# Patient Record
Sex: FEMALE | Race: WHITE | NOT HISPANIC OR LATINO | Employment: FULL TIME | URBAN - METROPOLITAN AREA
[De-identification: names, ages, dates, MRNs, and addresses within clinical notes are randomized per-mention and may not be internally consistent; named-entity substitution may affect disease eponyms.]

---
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
## 2023-01-30 ENCOUNTER — APPOINTMENT (OUTPATIENT)
Dept: GENERAL RADIOLOGY | Facility: CLINIC | Age: 23
End: 2023-01-30
Attending: EMERGENCY MEDICINE
Payer: COMMERCIAL

## 2023-01-30 ENCOUNTER — HOSPITAL ENCOUNTER (EMERGENCY)
Facility: CLINIC | Age: 23
Discharge: HOME | End: 2023-01-30
Attending: EMERGENCY MEDICINE | Admitting: EMERGENCY MEDICINE
Payer: COMMERCIAL

## 2023-01-30 VITALS
TEMPERATURE: 98.4 F | WEIGHT: 200 LBS | SYSTOLIC BLOOD PRESSURE: 119 MMHG | HEART RATE: 82 BPM | OXYGEN SATURATION: 100 % | HEIGHT: 66 IN | RESPIRATION RATE: 16 BRPM | DIASTOLIC BLOOD PRESSURE: 85 MMHG | BODY MASS INDEX: 32.14 KG/M2

## 2023-01-30 DIAGNOSIS — K21.9 GASTROESOPHAGEAL REFLUX DISEASE WITHOUT ESOPHAGITIS: ICD-10-CM

## 2023-01-30 DIAGNOSIS — R07.9 CHEST PAIN, UNSPECIFIED TYPE: ICD-10-CM

## 2023-01-30 LAB
ALBUMIN SERPL BCG-MCNC: 4.1 G/DL (ref 3.5–5.2)
ALP SERPL-CCNC: 120 U/L (ref 35–104)
ALT SERPL W P-5'-P-CCNC: 12 U/L (ref 10–35)
ANION GAP SERPL CALCULATED.3IONS-SCNC: 13 MMOL/L (ref 7–15)
AST SERPL W P-5'-P-CCNC: 15 U/L (ref 10–35)
ATRIAL RATE - MUSE: 66 BPM
BASOPHILS # BLD AUTO: 0 10E3/UL (ref 0–0.2)
BASOPHILS NFR BLD AUTO: 0 %
BILIRUB SERPL-MCNC: 0.8 MG/DL
BUN SERPL-MCNC: 9.6 MG/DL (ref 6–20)
CALCIUM SERPL-MCNC: 9.7 MG/DL (ref 8.6–10)
CHLORIDE SERPL-SCNC: 102 MMOL/L (ref 98–107)
CREAT SERPL-MCNC: 0.69 MG/DL (ref 0.51–0.95)
D DIMER PPP FEU-MCNC: <0.27 UG/ML FEU (ref 0–0.5)
DEPRECATED HCO3 PLAS-SCNC: 24 MMOL/L (ref 22–29)
DIASTOLIC BLOOD PRESSURE - MUSE: NORMAL MMHG
EOSINOPHIL # BLD AUTO: 0 10E3/UL (ref 0–0.7)
EOSINOPHIL NFR BLD AUTO: 0 %
ERYTHROCYTE [DISTWIDTH] IN BLOOD BY AUTOMATED COUNT: 13.4 % (ref 10–15)
GFR SERPL CREATININE-BSD FRML MDRD: >90 ML/MIN/1.73M2
GLUCOSE SERPL-MCNC: 97 MG/DL (ref 70–99)
HCT VFR BLD AUTO: 39.3 % (ref 35–47)
HGB BLD-MCNC: 12.6 G/DL (ref 11.7–15.7)
IMM GRANULOCYTES # BLD: 0 10E3/UL
IMM GRANULOCYTES NFR BLD: 0 %
INTERPRETATION ECG - MUSE: NORMAL
LIPASE SERPL-CCNC: 12 U/L (ref 13–60)
LYMPHOCYTES # BLD AUTO: 2 10E3/UL (ref 0.8–5.3)
LYMPHOCYTES NFR BLD AUTO: 29 %
MCH RBC QN AUTO: 28.2 PG (ref 26.5–33)
MCHC RBC AUTO-ENTMCNC: 32.1 G/DL (ref 31.5–36.5)
MCV RBC AUTO: 88 FL (ref 78–100)
MONOCYTES # BLD AUTO: 0.4 10E3/UL (ref 0–1.3)
MONOCYTES NFR BLD AUTO: 6 %
NEUTROPHILS # BLD AUTO: 4.5 10E3/UL (ref 1.6–8.3)
NEUTROPHILS NFR BLD AUTO: 65 %
NRBC # BLD AUTO: 0 10E3/UL
NRBC BLD AUTO-RTO: 0 /100
P AXIS - MUSE: 33 DEGREES
PLATELET # BLD AUTO: 240 10E3/UL (ref 150–450)
POTASSIUM SERPL-SCNC: 3.9 MMOL/L (ref 3.4–5.3)
PR INTERVAL - MUSE: 164 MS
PROT SERPL-MCNC: 7.7 G/DL (ref 6.4–8.3)
QRS DURATION - MUSE: 94 MS
QT - MUSE: 380 MS
QTC - MUSE: 398 MS
R AXIS - MUSE: 72 DEGREES
RBC # BLD AUTO: 4.47 10E6/UL (ref 3.8–5.2)
SODIUM SERPL-SCNC: 139 MMOL/L (ref 136–145)
SYSTOLIC BLOOD PRESSURE - MUSE: NORMAL MMHG
T AXIS - MUSE: 39 DEGREES
TROPONIN T SERPL HS-MCNC: <6 NG/L
VENTRICULAR RATE- MUSE: 66 BPM
WBC # BLD AUTO: 7 10E3/UL (ref 4–11)

## 2023-01-30 PROCEDURE — 96375 TX/PRO/DX INJ NEW DRUG ADDON: CPT | Performed by: EMERGENCY MEDICINE

## 2023-01-30 PROCEDURE — 250N000009 HC RX 250: Performed by: EMERGENCY MEDICINE

## 2023-01-30 PROCEDURE — 84484 ASSAY OF TROPONIN QUANT: CPT | Performed by: EMERGENCY MEDICINE

## 2023-01-30 PROCEDURE — 250N000011 HC RX IP 250 OP 636: Performed by: EMERGENCY MEDICINE

## 2023-01-30 PROCEDURE — 96374 THER/PROPH/DIAG INJ IV PUSH: CPT | Performed by: EMERGENCY MEDICINE

## 2023-01-30 PROCEDURE — 83690 ASSAY OF LIPASE: CPT | Performed by: EMERGENCY MEDICINE

## 2023-01-30 PROCEDURE — 71046 X-RAY EXAM CHEST 2 VIEWS: CPT

## 2023-01-30 PROCEDURE — 99285 EMERGENCY DEPT VISIT HI MDM: CPT | Mod: 25 | Performed by: EMERGENCY MEDICINE

## 2023-01-30 PROCEDURE — 80053 COMPREHEN METABOLIC PANEL: CPT | Performed by: EMERGENCY MEDICINE

## 2023-01-30 PROCEDURE — 36415 COLL VENOUS BLD VENIPUNCTURE: CPT | Performed by: EMERGENCY MEDICINE

## 2023-01-30 PROCEDURE — 250N000013 HC RX MED GY IP 250 OP 250 PS 637: Performed by: EMERGENCY MEDICINE

## 2023-01-30 PROCEDURE — 85025 COMPLETE CBC W/AUTO DIFF WBC: CPT | Performed by: EMERGENCY MEDICINE

## 2023-01-30 PROCEDURE — 93005 ELECTROCARDIOGRAM TRACING: CPT | Performed by: EMERGENCY MEDICINE

## 2023-01-30 PROCEDURE — 85379 FIBRIN DEGRADATION QUANT: CPT | Performed by: EMERGENCY MEDICINE

## 2023-01-30 RX ORDER — KETOROLAC TROMETHAMINE 15 MG/ML
15 INJECTION, SOLUTION INTRAMUSCULAR; INTRAVENOUS ONCE
Status: COMPLETED | OUTPATIENT
Start: 2023-01-30 | End: 2023-01-30

## 2023-01-30 RX ADMIN — FAMOTIDINE 20 MG: 10 INJECTION INTRAVENOUS at 09:35

## 2023-01-30 RX ADMIN — ALUMINUM HYDROXIDE, MAGNESIUM HYDROXIDE, AND SIMETHICONE 30 ML: 200; 200; 20 SUSPENSION ORAL at 10:02

## 2023-01-30 RX ADMIN — KETOROLAC TROMETHAMINE 15 MG: 15 INJECTION, SOLUTION INTRAMUSCULAR; INTRAVENOUS at 09:35

## 2023-01-30 ASSESSMENT — ENCOUNTER SYMPTOMS
SHORTNESS OF BREATH: 1
COUGH: 0
FEVER: 0

## 2023-01-30 ASSESSMENT — ACTIVITIES OF DAILY LIVING (ADL): ADLS_ACUITY_SCORE: 35

## 2023-01-30 NOTE — ED TRIAGE NOTES
SOB and left sided chest pain since Saturday.     Triage Assessment     Row Name 01/30/23 0896       Triage Assessment (Adult)    Airway WDL WDL       Respiratory WDL    Respiratory WDL X  reports SOB, denies cough       Skin Circulation/Temperature WDL    Skin Circulation/Temperature WDL WDL       Cardiac WDL    Cardiac WDL X  left sided chest pain since saturday with SOB       Peripheral/Neurovascular WDL    Peripheral Neurovascular WDL WDL       Cognitive/Neuro/Behavioral WDL    Cognitive/Neuro/Behavioral WDL WDL

## 2023-01-30 NOTE — ED PROVIDER NOTES
"  History     Chief Complaint:  Chest Pain and Shortness of Breath       The history is provided by the patient.      Sharonda Moore is a 22 year old female who presents with sharp left-sided chest pain and shortness of breath since Saturday at noon. The pain is worsened when taking deep breaths, and since 0600 this morning it has been constant. She recently returned from a trip to [place] by plane, and has taken birth control in [place]. She denies fever, cough, congestion, recent injury, or history of blood clots.  Patient with a history of GERD and not currently medicated for this.  She also states she has been using ibuprofen about once a week.  No family history of sudden cardiac death or early MI      Independent Historian: None     Review of External Notes: ED visit from 10/23/2019    ROS:  Review of Systems   Constitutional: Negative for fever.   HENT: Negative for congestion.    Respiratory: Positive for shortness of breath. Negative for cough.    Cardiovascular: Positive for chest pain.   All other systems reviewed and are negative.      Allergies:  The patient has no known allergies.     Medications:  The patient is currently on no regular medications.    Past Medical History:     The patient denies past medical history.      Past Surgical History:    None    Family History:    None    Social History:  The patient presented to the ED accompanied.  The patient arrived to the ED via private vehicle.  The patient denies cigarette use.    Physical Exam     Patient Vitals for the past 24 hrs:   BP Temp Temp src Pulse Resp SpO2 Height Weight   01/30/23 1040 119/85 -- -- 82 16 100 % -- --   01/30/23 0829 132/68 98.4  F (36.9  C) Oral 83 17 98 % 1.676 m (5' 6\") 90.7 kg (200 lb)        Physical Exam  General: Patient is alert and normal appearing.  HEENT: Head atraumatic    Eyes: pupils equal and reactive. Conjunctiva clear   Nares: patent   Oropharynx: no lesions, uvula midline, no palatal draping, normal " voice, no trismus  Neck: Supple without lymphadenopathy, no meningismus  Chest: Heart regular rate and rhythm.   Lungs: Equal clear to auscultation with no wheeze or rales  Abdomen: Soft, normal epigastric tenderness to palpation with no rebound or guarding, nondistended, normal bowel sounds  Back: No costovertebral angle tenderness, no midline C, T or L spine tenderness  Neuro: Grossly nonfocal, normal speech, strength equal bilaterally, CN 2-12 intact  Extremities: No deformities, equal radial and DP pulses. No clubbing, cyanosis.  No edema  Skin: Warm and dry with no rash.       Emergency Department Course     ECG results from 01/30/23 - EKG read by Dr. Gardner at 0816.   EKG 12-lead, tracing only     Value    Systolic Blood Pressure     Diastolic Blood Pressure     Ventricular Rate 66    Atrial Rate 66    HI Interval 164    QRS Duration 94        QTc 398    P Axis 33    R AXIS 72    T Axis 39    Interpretation ECG      Sinus rhythm with marked sinus arrhythmia  Otherwise normal ECG  When compared with ECG of 23-OCT-2019 13:39,  Vent. rate has decreased BY  54 BPM       Imaging:  XR Chest 2 Views   Final Result   IMPRESSION: Negative chest.      SHEY MORA MD            SYSTEM ID:  [id number]         Report per radiology    Laboratory:  Labs Ordered and Resulted from Time of ED Arrival to Time of ED Departure   COMPREHENSIVE METABOLIC PANEL - Abnormal       Result Value    Sodium 139      Potassium 3.9      Chloride 102      Carbon Dioxide (CO2) 24      Anion Gap 13      Urea Nitrogen 9.6      Creatinine 0.69      Calcium 9.7      Glucose 97      Alkaline Phosphatase 120 (*)     AST 15      ALT 12      Protein Total 7.7      Albumin 4.1      Bilirubin Total 0.8      GFR Estimate >90     LIPASE - Abnormal    Lipase 12 (*)    D DIMER QUANTITATIVE - Normal    D-Dimer Quantitative <0.27     TROPONIN T, HIGH SENSITIVITY - Normal    Troponin T, High Sensitivity <6     CBC WITH PLATELETS AND DIFFERENTIAL    WBC  Count 7.0      RBC Count 4.47      Hemoglobin 12.6      Hematocrit 39.3      MCV 88      MCH 28.2      MCHC 32.1      RDW 13.4      Platelet Count 240      % Neutrophils 65      % Lymphocytes 29      % Monocytes 6      % Eosinophils 0      % Basophils 0      % Immature Granulocytes 0      NRBCs per 100 WBC 0      Absolute Neutrophils 4.5      Absolute Lymphocytes 2.0      Absolute Monocytes 0.4      Absolute Eosinophils 0.0      Absolute Basophils 0.0      Absolute Immature Granulocytes 0.0      Absolute NRBCs 0.0        Emergency Department Course & Assessments:    Interventions:  Medications   ketorolac (TORADOL) injection 15 mg (15 mg Intravenous Given 1/30/23 0935)   famotidine (PEPCID) injection 20 mg (20 mg Intravenous Given 1/30/23 0935)   lidocaine (viscous) (XYLOCAINE) 2 % 15 mL, alum & mag hydroxide-simethicone (MAALOX) 15 mL GI Cocktail (30 mLs Oral Given 1/30/23 1002)            Independent Interpretation (X-rays, CTs, rhythm strip):  I reviewed the X-ray.     Consultations/Discussion of Management or Tests:  None     Social Determinants of Health affecting care:  None     Assessments:  0906 I obtained history and examined the patient, as noted above.  1037 I rechecked and updated the patient. She is feeling improved after a GI cocktail.    Disposition:  The patient was discharged to home.     Impression & Plan      Medical Decision Making:  Sharonda Moore is a 22 year old female who presents for evaluation of chest pain.  This pain has lasted for several hours now. Initial laboratory and imaging tests have come back normal.  There has been no progression of symptoms or other new concerning symptoms.   Relief of symptoms following GI cocktail.  D-dimer is negative making PE unlikely.  Chest x-ray without pneumothorax or infiltrate.  Troponin is negative and EKG without new acute ischemic changes.  Do not suspect aortic dissection based on description and chest x-ray.    A broad differential was of course  considered.  Exceedingly low risk for unstable angina at this point and will therefore not admit formally and administer heparin.    Discussed HEART score with patient and shared decision making regarding disposition and further workup was done. .      Suspect a component of GERD.  We will place her on omeprazole twice daily.  Recommend follow-up with primary care as well as GI.  If she has progression of symptoms with a further declare himself she is requested to return to the emergency department for further work-up and evaluation.      Diagnosis:    ICD-10-CM    1. Chest pain, unspecified type  R07.9       2. Gastroesophageal reflux disease without esophagitis  K21.9            Discharge Medications:  Discharge Medication List as of 1/30/2023 10:43 AM      START taking these medications    Details   omeprazole (PRILOSEC) 20 MG DR capsule Take 1 capsule (20 mg) by mouth 2 times daily for 30 days, Disp-60 capsule, R-0, Local Print              Scribe Disclosure:  Martin MC, am serving as a scribe at 8:48 AM on 1/30/2023 to document services personally performed by Trang Gardner MD based on my observations and the provider's statements to me.      Trang Gardner MD  01/30/23 2793

## 2023-12-08 ENCOUNTER — HOSPITAL ENCOUNTER (EMERGENCY)
Facility: CLINIC | Age: 23
Discharge: HOME | End: 2023-12-08
Attending: EMERGENCY MEDICINE | Admitting: EMERGENCY MEDICINE
Payer: COMMERCIAL

## 2023-12-08 VITALS
HEART RATE: 106 BPM | SYSTOLIC BLOOD PRESSURE: 118 MMHG | RESPIRATION RATE: 18 BRPM | BODY MASS INDEX: 32.28 KG/M2 | WEIGHT: 200 LBS | OXYGEN SATURATION: 98 % | DIASTOLIC BLOOD PRESSURE: 84 MMHG | TEMPERATURE: 98.5 F

## 2023-12-08 DIAGNOSIS — L50.9 URTICARIA: ICD-10-CM

## 2023-12-08 PROCEDURE — 99284 EMERGENCY DEPT VISIT MOD MDM: CPT

## 2023-12-08 RX ORDER — PREDNISONE 10 MG/1
TABLET ORAL
Qty: 32 TABLET | Refills: 0 | Status: SHIPPED | OUTPATIENT
Start: 2023-12-08 | End: 2023-12-18

## 2023-12-08 RX ORDER — FAMOTIDINE 40 MG/1
40 TABLET, FILM COATED ORAL 2 TIMES DAILY PRN
Qty: 28 TABLET | Refills: 0 | Status: SHIPPED | OUTPATIENT
Start: 2023-12-08

## 2023-12-08 NOTE — ED TRIAGE NOTES
Patient states hives, rash, SOB, lip swelling since Tues.  Denies allergies.     Triage Assessment (Adult)       Row Name 12/08/23 0341          Triage Assessment    Airway WDL WDL        Respiratory WDL    Respiratory WDL X;all     Rhythm/Pattern, Respiratory shortness of breath        Skin Circulation/Temperature WDL    Skin Circulation/Temperature WDL X  Reports hives, rash        Cardiac WDL    Cardiac WDL WDL        Peripheral/Neurovascular WDL    Peripheral Neurovascular WDL WDL        Cognitive/Neuro/Behavioral WDL    Cognitive/Neuro/Behavioral WDL WDL

## 2023-12-08 NOTE — DISCHARGE INSTRUCTIONS
*You may resume diet and activities as tolerated.  Avoid known allergens.  *Take medications as prescribed.  Prednisone and pepcid as directed as needed.  Benadryl or a nonsedating antihistamine as directed as needed. Continue your current medications.  *If you develop symptoms of anaphylaxis (throat swelling, cough, difficulty in breathing, ligthheaded), return to the emergency department immediately.  *Return if you become worse in any way.

## 2023-12-08 NOTE — ED PROVIDER NOTES
History     Chief Complaint:  Rash     HPI   Sharonda Moore is a 23 year old female who presents for evaluation of a rash. The patient reports she has had a urticarial rash for a few days. States that she has been taking benadryl in the evening and wakes up without the rash, but the benadryl has stopped being effective. Notes that the rash is very itchy and that it is now accompanied by lip swelling and shortness of breath. Denies known allergies. Denies new detergents or lotions. Denies vomiting, abdominal pain, and recent viral illness.    Independent Historian:   None - Patient Only    Review of External Notes:   None    Medications:    Sertraline     Past Medical History:    Obesity  LANCE  Migraine     Physical Exam   Patient Vitals for the past 24 hrs:   BP Temp Temp src Pulse Resp SpO2 Weight   12/08/23 0350 118/84 98.5  F (36.9  C) Temporal 106 18 98 % 90.7 kg (200 lb)      Physical Exam  General: Well-nourished, appears to be resting comfortably on cart when I enter the room  Eyes: PERRL, conjunctivae pink no scleral icterus or conjunctival injection  ENT:  Moist mucus membranes, posterior oropharynx clear without erythema or exudates.  Uvula midline without edema, no tongue/lip/oropharngeal swelling.  Mallampati I.  No stridor or wheezing.  Respiratory:  Lungs clear to auscultation bilaterally, no crackles/rubs/wheezes.  Good air movement  CV: Normal rate and rhythm, no murmurs/rubs/gallops  GI:  Abdomen soft and non-distended.  Normoactive BS.  No tenderness, guarding or rebound  Skin: Warm, dry.  No rashes or petechiae.  Urticaria over the back, anterior chest wall and parts of the face.  Musculoskeletal: No peripheral edema or calf tenderness  Neuro: Alert and oriented to person/place/time  Psychiatric: Normal affect      Emergency Department Course   Emergency Department Course & Assessments:     Interventions:  Medications - No data to display     Assessments:  0432 I obtained history and examined the  patient as noted above.     Independent Interpretation (X-rays, CTs, rhythm strip):  None    Consultations/Discussion of Management or Tests:  None        Social Determinants of Health affecting care:   None    Disposition:  The patient was discharged to home.     Impression & Plan    Medical Decision Making:  Sharonda Moore is a 23 year old female who presents with complaint of allergic reaction.  The patient has hives but no signs of airway compromise or anaphylaxis.  There are no new exposures to suggest a specific allergan.  I suspect the etiology is likely viral.  She has no specific viral symptoms, however, and no indication for COVID testing.  Her symptoms have been going on for days.  The itching is very bothersome to her and so we discussed a course of prednisone including the risk versus benefits.  We will give a prescription in the pocket should the itching become too overwhelming.  She is also given a prescription for Pepcid as an adjunct for treatment of the urticaria.  At this point there are no signs of worsening and I believe the patient is safe for discharge home.  Recommended follow-up with PMD in 1 week for persistent symptoms and gave precautions to return if worsening.    Diagnosis:    ICD-10-CM    1. Urticaria  L50.9            Discharge Medications:  New Prescriptions    FAMOTIDINE (PEPCID) 40 MG TABLET    Take 1 tablet (40 mg) by mouth 2 times daily as needed for other (itching)    PREDNISONE (DELTASONE) 10 MG TABLET    Take 4 tablets daily for 5 days,  take 2 tablets daily for 3 days, take 1 tablet daily for 3 days, take half a tablet for 3 days.      Scribe Disclosure:  Nancy MC, am serving as a scribe at 4:00 AM on 12/8/2023 to document services personally performed by Es Coburn MD based on my observations and the provider's statements to me.     12/8/2023   Es Coburn MD Cho, Amy C, MD  12/08/23 0684

## 2025-05-14 ENCOUNTER — HOSPITAL ENCOUNTER (EMERGENCY)
Facility: CLINIC | Age: 25
Discharge: HOME | End: 2025-05-14
Attending: EMERGENCY MEDICINE | Admitting: EMERGENCY MEDICINE

## 2025-05-14 VITALS
DIASTOLIC BLOOD PRESSURE: 77 MMHG | RESPIRATION RATE: 16 BRPM | BODY MASS INDEX: 32.6 KG/M2 | WEIGHT: 202.82 LBS | TEMPERATURE: 97 F | OXYGEN SATURATION: 100 % | HEIGHT: 66 IN | SYSTOLIC BLOOD PRESSURE: 111 MMHG | HEART RATE: 57 BPM

## 2025-05-14 DIAGNOSIS — O21.9 NAUSEA AND VOMITING DURING PREGNANCY: ICD-10-CM

## 2025-05-14 LAB
ANION GAP SERPL CALCULATED.3IONS-SCNC: 13 MMOL/L (ref 7–15)
BASOPHILS # BLD AUTO: 0 10E3/UL (ref 0–0.2)
BASOPHILS NFR BLD AUTO: 0 %
BUN SERPL-MCNC: 6.5 MG/DL (ref 6–20)
CALCIUM SERPL-MCNC: 9.9 MG/DL (ref 8.8–10.4)
CHLORIDE SERPL-SCNC: 102 MMOL/L (ref 98–107)
CREAT SERPL-MCNC: 0.63 MG/DL (ref 0.51–0.95)
EGFRCR SERPLBLD CKD-EPI 2021: >90 ML/MIN/1.73M2
EOSINOPHIL # BLD AUTO: 0 10E3/UL (ref 0–0.7)
EOSINOPHIL NFR BLD AUTO: 0 %
ERYTHROCYTE [DISTWIDTH] IN BLOOD BY AUTOMATED COUNT: 12.9 % (ref 10–15)
GLUCOSE SERPL-MCNC: 86 MG/DL (ref 70–99)
HCG INTACT+B SERPL-ACNC: ABNORMAL MIU/ML
HCO3 SERPL-SCNC: 23 MMOL/L (ref 22–29)
HCT VFR BLD AUTO: 39 % (ref 35–47)
HGB BLD-MCNC: 13.4 G/DL (ref 11.7–15.7)
HOLD SPECIMEN: NORMAL
IMM GRANULOCYTES # BLD: 0 10E3/UL
IMM GRANULOCYTES NFR BLD: 0 %
LYMPHOCYTES # BLD AUTO: 1.5 10E3/UL (ref 0.8–5.3)
LYMPHOCYTES NFR BLD AUTO: 24 %
MAGNESIUM SERPL-MCNC: 2 MG/DL (ref 1.7–2.3)
MCH RBC QN AUTO: 28.8 PG (ref 26.5–33)
MCHC RBC AUTO-ENTMCNC: 34.4 G/DL (ref 31.5–36.5)
MCV RBC AUTO: 84 FL (ref 78–100)
MONOCYTES # BLD AUTO: 0.5 10E3/UL (ref 0–1.3)
MONOCYTES NFR BLD AUTO: 9 %
NEUTROPHILS # BLD AUTO: 4.3 10E3/UL (ref 1.6–8.3)
NEUTROPHILS NFR BLD AUTO: 67 %
NRBC # BLD AUTO: 0 10E3/UL
NRBC BLD AUTO-RTO: 0 /100
PLATELET # BLD AUTO: 244 10E3/UL (ref 150–450)
POTASSIUM SERPL-SCNC: 3.9 MMOL/L (ref 3.4–5.3)
RBC # BLD AUTO: 4.65 10E6/UL (ref 3.8–5.2)
SODIUM SERPL-SCNC: 138 MMOL/L (ref 135–145)
WBC # BLD AUTO: 6.4 10E3/UL (ref 4–11)

## 2025-05-14 PROCEDURE — 96375 TX/PRO/DX INJ NEW DRUG ADDON: CPT

## 2025-05-14 PROCEDURE — 96374 THER/PROPH/DIAG INJ IV PUSH: CPT

## 2025-05-14 PROCEDURE — 99284 EMERGENCY DEPT VISIT MOD MDM: CPT | Mod: 25

## 2025-05-14 PROCEDURE — 84702 CHORIONIC GONADOTROPIN TEST: CPT | Performed by: EMERGENCY MEDICINE

## 2025-05-14 PROCEDURE — 85025 COMPLETE CBC W/AUTO DIFF WBC: CPT | Performed by: EMERGENCY MEDICINE

## 2025-05-14 PROCEDURE — 250N000011 HC RX IP 250 OP 636: Mod: JZ | Performed by: EMERGENCY MEDICINE

## 2025-05-14 PROCEDURE — 80048 BASIC METABOLIC PNL TOTAL CA: CPT | Performed by: EMERGENCY MEDICINE

## 2025-05-14 PROCEDURE — 36415 COLL VENOUS BLD VENIPUNCTURE: CPT | Performed by: EMERGENCY MEDICINE

## 2025-05-14 PROCEDURE — 258N000003 HC RX IP 258 OP 636: Performed by: EMERGENCY MEDICINE

## 2025-05-14 PROCEDURE — 83735 ASSAY OF MAGNESIUM: CPT | Performed by: EMERGENCY MEDICINE

## 2025-05-14 PROCEDURE — 96361 HYDRATE IV INFUSION ADD-ON: CPT

## 2025-05-14 RX ORDER — METOCLOPRAMIDE HYDROCHLORIDE 5 MG/ML
10 INJECTION INTRAMUSCULAR; INTRAVENOUS ONCE
Status: COMPLETED | OUTPATIENT
Start: 2025-05-14 | End: 2025-05-14

## 2025-05-14 RX ADMIN — SODIUM CHLORIDE 1000 ML: 0.9 INJECTION, SOLUTION INTRAVENOUS at 10:45

## 2025-05-14 RX ADMIN — METOCLOPRAMIDE 10 MG: 5 INJECTION, SOLUTION INTRAMUSCULAR; INTRAVENOUS at 10:45

## 2025-05-14 RX ADMIN — FAMOTIDINE 20 MG: 10 INJECTION, SOLUTION INTRAVENOUS at 10:45

## 2025-05-14 ASSESSMENT — COLUMBIA-SUICIDE SEVERITY RATING SCALE - C-SSRS
1. IN THE PAST MONTH, HAVE YOU WISHED YOU WERE DEAD OR WISHED YOU COULD GO TO SLEEP AND NOT WAKE UP?: NO
6. HAVE YOU EVER DONE ANYTHING, STARTED TO DO ANYTHING, OR PREPARED TO DO ANYTHING TO END YOUR LIFE?: NO
2. HAVE YOU ACTUALLY HAD ANY THOUGHTS OF KILLING YOURSELF IN THE PAST MONTH?: NO

## 2025-05-14 ASSESSMENT — ACTIVITIES OF DAILY LIVING (ADL)
ADLS_ACUITY_SCORE: 41
ADLS_ACUITY_SCORE: 41

## 2025-05-14 NOTE — ED TRIAGE NOTES
Pt is 8 wks pregnant and states she hasn't been able to keep down any food or liquids for 2 days. No blood in vomit. Pt has not been seen by OB yet. Pt states she has taken zofran at home without relief.

## 2025-05-14 NOTE — DISCHARGE INSTRUCTIONS
Vitamin B6 and Unisom at night will help with nausea the following day.  If you are still having symptoms you can take an additional dose midday.  Continue Zofran and the previously prescribed Reglan as needed for nausea and vomiting.  Try to always have something bland in your stomach.  Consider over-the-counter famotidine.  Follow-up with OB.  Return with uncontrolled vomiting, cramping or vaginal bleeding, or any other new or concerning symptoms.

## 2025-05-14 NOTE — ED PROVIDER NOTES
"  Emergency Department Note      History of Present Illness     Chief Complaint   Emesis During Pregnancy      HPI   Sharonda Moore is a 24 year old female  at 8w1d by stated BARRETT presenting with her boyfriend for evaluation of emesis during pregnancy.  She has been struggling with vomiting for the last 3 weeks.  She did visit a pregnancy clinic to confirm pregnancy and IUP by limited ultrasound.  They provided her with Zofran and suggested jose antonio, Unisom, and B6.  She has been trying these without improvement.  She continues to have daily vomiting with a 12 pound weight loss and presents today because she has been unable to keep down anything in the last 2 days.  She called and was prescribed Reglan but has not picked this up yet.  She denies cramping or vaginal bleeding.  She does not have an established OB for this pregnancy as she is having some insurance issues.    Independent Historian   None    Review of External Notes   No relevant visits    Past Medical History     Medical History and Problem List   No past medical history    Medications   famotidine (PEPCID) 40 MG tablet  hyoscyamine SL (LEVSIN/SL) 0.125 MG sublingual tablet    Physical Exam     Patient Vitals for the past 24 hrs:   BP Temp Temp src Pulse Resp SpO2 Height Weight   25 1244 111/77 -- -- 57 16 100 % -- --   25 1008 135/84 97  F (36.1  C) Temporal 106 17 98 % 1.676 m (5' 6\") 92 kg (202 lb 13.2 oz)     Physical Exam  General: Well-developed and well-nourished. Well appearing young -American woman. Cooperative.  Head:  Atraumatic.  Eyes:  Conjunctivae, lids, and sclerae are normal.  ENT:    Normal nose. Moist mucous membranes.  Neck:  Supple. Normal range of motion.  CV:  Regular rate and rhythm. Normal heart sounds with no murmurs, rubs, or gallops detected.  Resp:  No respiratory distress. Clear to auscultation bilaterally without decreased breath sounds, wheezing, rales, or rhonchi.  GI:  Soft. Non-distended. " Non-tender.    MS:  Normal ROM.  Skin:  Warm. Non-diaphoretic. No pallor.  Neuro:  Awake. A&Ox3. Normal strength.  Psych: Normal mood and affect. Normal speech.  Vitals reviewed.    Diagnostics     Lab Results   Labs Ordered and Resulted from Time of ED Arrival to Time of ED Departure   HCG QUANTITATIVE PREGNANCY - Abnormal       Result Value    hCG Quantitative 157,172 (*)    BASIC METABOLIC PANEL - Normal    Sodium 138      Potassium 3.9      Chloride 102      Carbon Dioxide (CO2) 23      Anion Gap 13      Urea Nitrogen 6.5      Creatinine 0.63      GFR Estimate >90      Calcium 9.9      Glucose 86     MAGNESIUM - Normal    Magnesium 2.0     CBC WITH PLATELETS AND DIFFERENTIAL    WBC Count 6.4      RBC Count 4.65      Hemoglobin 13.4      Hematocrit 39.0      MCV 84      MCH 28.8      MCHC 34.4      RDW 12.9      Platelet Count 244      % Neutrophils 67      % Lymphocytes 24      % Monocytes 9      % Eosinophils 0      % Basophils 0      % Immature Granulocytes 0      NRBCs per 100 WBC 0      Absolute Neutrophils 4.3      Absolute Lymphocytes 1.5      Absolute Monocytes 0.5      Absolute Eosinophils 0.0      Absolute Basophils 0.0      Absolute Immature Granulocytes 0.0      Absolute NRBCs 0.0       ED Course      Medications Administered   Medications   sodium chloride 0.9% BOLUS 1,000 mL (0 mLs Intravenous Stopped 5/14/25 1131)   metoclopramide (REGLAN) injection 10 mg (10 mg Intravenous $Given 5/14/25 1045)   famotidine (PEPCID) injection 20 mg (20 mg Intravenous $Given 5/14/25 1045)     ED Course   ED Course as of 05/14/25 1252   Wed May 14, 2025   1240 I reevaluated the patient who feels improved and has tolerated PO, comfortable with discharge.     Additional Documentation  Social Determinants of Health: Healthcare Access/Compliance(no established OB), Financial Insecurity(insurance issues are complicating care), and Social Connections/Isolation(supportive boyfriend)    Medical Decision Making / Diagnosis    DENAE Mcdonough is a 24 year old woman  at 8 weeks and 1 day who has been struggling with vomiting over the last 3 weeks despite Zofran, jose antonio, Unisom, and B6.  She describes a associated weight loss without abdominal cramping or vaginal bleeding.  She is well-appearing on exam with minimal tachycardia.  There is no abdominal tenderness.  Without abdominal pain or vaginal bleeding there is no indication for ultrasound as she reports confirmation of IUP as an outpatient.    Fortunately, laboratory studies are quite reassuring.  She does not have electrolyte derangements of note.  She does not have kidney injury.  Further, she felt improved with IV fluids, Reglan, and Pepcid.  Her heart rate improved.  She was able to tolerate PO and is comfortable with discharge upon repeat evaluation.  I counseled her on treatment for nausea and vomiting during pregnancy including appropriate use of B6 and Unisom.  She has been prescribed Reglan already that she will go  and I suggested she also use over-the-counter famotidine.  We discussed always having something in her stomach and the importance of following up with OB.  However, she will return if she has worsening or new concerns.  All questions answered.    Disposition   The patient was discharged.     Diagnosis     ICD-10-CM    1. Nausea and vomiting during pregnancy  O21.9            Discharge Medications   Discharge Medication List as of 2025 12:39 PM           Alexandria Bynum MD  [phone]